# Patient Record
Sex: FEMALE | Race: WHITE | Employment: PART TIME | URBAN - METROPOLITAN AREA
[De-identification: names, ages, dates, MRNs, and addresses within clinical notes are randomized per-mention and may not be internally consistent; named-entity substitution may affect disease eponyms.]

---

## 2024-06-06 ENCOUNTER — HOSPITAL ENCOUNTER (EMERGENCY)
Age: 29
Discharge: HOME | End: 2024-06-06
Attending: EMERGENCY MEDICINE

## 2024-06-06 VITALS
HEIGHT: 61 IN | DIASTOLIC BLOOD PRESSURE: 89 MMHG | OXYGEN SATURATION: 99 % | SYSTOLIC BLOOD PRESSURE: 119 MMHG | RESPIRATION RATE: 16 BRPM | HEART RATE: 82 BPM | TEMPERATURE: 99.3 F | WEIGHT: 118.1 LBS | BODY MASS INDEX: 22.3 KG/M2

## 2024-06-06 DIAGNOSIS — R52 BODY ACHES: ICD-10-CM

## 2024-06-06 DIAGNOSIS — T07.XXXA MULTIPLE CONTUSIONS: ICD-10-CM

## 2024-06-06 DIAGNOSIS — S60.511A ABRASION OF RIGHT HAND, INITIAL ENCOUNTER: ICD-10-CM
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
## 2024-06-06 PROCEDURE — 99284 EMERGENCY DEPT VISIT MOD MDM: CPT

## 2024-06-06 PROCEDURE — 90471 IMMUNIZATION ADMIN: CPT | Performed by: PHYSICIAN ASSISTANT

## 2024-06-06 PROCEDURE — 6370000000 HC RX 637 (ALT 250 FOR IP): Performed by: PHYSICIAN ASSISTANT

## 2024-06-06 PROCEDURE — 90715 TDAP VACCINE 7 YRS/> IM: CPT | Performed by: PHYSICIAN ASSISTANT

## 2024-06-06 PROCEDURE — 6360000002 HC RX W HCPCS: Performed by: PHYSICIAN ASSISTANT

## 2024-06-06 RX ORDER — CYCLOBENZAPRINE HCL 10 MG
10 TABLET ORAL ONCE
Status: COMPLETED | OUTPATIENT
Start: 2024-06-06 | End: 2024-06-06

## 2024-06-06 RX ORDER — NAPROXEN 250 MG/1
500 TABLET ORAL ONCE
Status: COMPLETED | OUTPATIENT
Start: 2024-06-06 | End: 2024-06-06

## 2024-06-06 RX ORDER — ACETAMINOPHEN 500 MG
1000 TABLET ORAL ONCE
Status: COMPLETED | OUTPATIENT
Start: 2024-06-06 | End: 2024-06-06

## 2024-06-06 RX ORDER — NAPROXEN 500 MG/1
500 TABLET ORAL 2 TIMES DAILY
Qty: 20 TABLET | Refills: 0 | Status: SHIPPED | OUTPATIENT
Start: 2024-06-06 | End: 2024-06-16

## 2024-06-06 RX ORDER — LIDOCAINE 50 MG/G
1 PATCH TOPICAL DAILY
Qty: 30 PATCH | Refills: 0 | Status: SHIPPED | OUTPATIENT
Start: 2024-06-06

## 2024-06-06 RX ORDER — CYCLOBENZAPRINE HCL 10 MG
10 TABLET ORAL 3 TIMES DAILY PRN
Qty: 20 TABLET | Refills: 0 | Status: SHIPPED | OUTPATIENT
Start: 2024-06-06 | End: 2024-06-16

## 2024-06-06 RX ADMIN — TETANUS TOXOID, REDUCED DIPHTHERIA TOXOID AND ACELLULAR PERTUSSIS VACCINE, ADSORBED 0.5 ML: 5; 2.5; 8; 8; 2.5 SUSPENSION INTRAMUSCULAR at 17:00

## 2024-06-06 RX ADMIN — CYCLOBENZAPRINE 10 MG: 10 TABLET, FILM COATED ORAL at 17:00

## 2024-06-06 RX ADMIN — NAPROXEN SODIUM 500 MG: 250 TABLET ORAL at 17:00

## 2024-06-06 RX ADMIN — ACETAMINOPHEN 1000 MG: 500 TABLET ORAL at 17:00

## 2024-06-06 ASSESSMENT — PAIN SCALES - GENERAL: PAINLEVEL_OUTOF10: 6

## 2024-06-06 NOTE — ED NOTES
Accidental fall off of scooter onto cement 6/4.  No LOC.   Pain at 6 to bilat clavicle, bilat upper arms, lower ant ribs bilat, post neck.  Upper mid chest pain at 4.  Scattered painful abrasions right hand and left foot.

## 2024-06-06 NOTE — ED PROVIDER NOTES
[facility]  EMERGENCY DEPARTMENT ENCOUNTER        Pt Name: Maritza Lehman  MRN: [id number]  Birthdate 1995  Date of evaluation: 6/6/2024  Provider: Javier Brito PA-C  PCP: No primary care provider on file.  Note Started: 6:03 PM EDT 6/6/24       I have seen and evaluated this patient with my supervising physician ALFREDO HAYES      CHIEF COMPLAINT       Chief Complaint   Patient presents with    Fall     Accidental fall off of scooter onto cement 6/4.  No LOC.   Pain at 6 to bilat clavicle, bilat upper arms, lower ant ribs bilat, post neck.  Upper mid chest pain at 4.  Scattered painful abrasions right hand and left foot.          HISTORY OF PRESENT ILLNESS: 1 or more Elements     History From: patient  Limitations to history : None    Maritza Lehman is a 28 y.o. female who presents to the emergency department with a chief complaint of some generalized bodyaches, bruising and abrasion to the back of her right hand.  2 days ago she was riding her scooter to work when and she did not realize that there was new concrete lighting her scooter got stuck in the wet concrete causing her to fall.  She states that she just had some pain and burning around the areas of her abrasions but still went to work yesterday and the day that this happened.  She states that she is just had some generalized bodyaches since this happened and has not taken anything for her symptoms causing her to present to the emergency department today.  She does not believe she fractured anything.  Denies use of anticoagulants or any ongoing medical issues.  Denies cough, shortness of breath, vomiting, urinary or bowel symptoms, difficulty moving her extremities.  Rates her symptoms a 6 out of 10.  She is unsure of her last tetanus vaccination.    Nursing Notes were all reviewed and agreed with or any disagreements were addressed in the HPI.    REVIEW OF SYSTEMS :      Review of Systems    Positives

## 2024-06-06 NOTE — ED PROVIDER NOTES
I personally evaluated and examined the patient in conjunction with the EDUARDO and agree with the assessment, treatment plan and disposition of the patient as recorded by the EDUARDO.     I reviewed pertinent nursing notes, triage notes, vital signs, past medical history, family and social history, medications, and allergies.     Complete review of systems was conducted by the EDUARDO and/or myself. Review of systems is negative except as documented in the history of present illness.     Brief HPI: 28-year-old female presents emergency department chief complaint of shoulder pain and hand pain after a fall off of a scooter yesterday      Physical Exam: Patient has tenderness to palpation both trapezius muscles and abrasion noted on her right hand.  There is no obvious deformity.  No bony tenderness no midline tender to palpation CT LS spine.  Pelvis is stable no extremity deformities or tenderness.    X-ray imaging not indicated.  Patient will be treated symptomatically and given strict return precautions.    Patient instructed to follow up with their primary care doctor in one-two days and return to the emergency department if worsening of the condition or any other concerns. Please see discharge instructions for further delineation regarding the specific discharge instructions explained and given to the patient.             FINAL IMPRESSION     1. Fall, initial encounter    2. Abrasion of right hand, initial encounter    3. Body aches    4. Multiple contusions            Electronically signed by:   Pablo Penn D.O. S, DO  06/06/24 [id number]

## 2024-06-06 NOTE — ED NOTES
Pain is same.      Discharge instructions with pt.  Explained rx's.  Encouraged follow up with PCP and to return to ED as needed.    Explained importance of having a family doctor for regular medical care.  Explained how to get a family doctor.     [facility] referral line given.